# Patient Record
Sex: MALE | Race: WHITE | HISPANIC OR LATINO | URBAN - METROPOLITAN AREA
[De-identification: names, ages, dates, MRNs, and addresses within clinical notes are randomized per-mention and may not be internally consistent; named-entity substitution may affect disease eponyms.]

---

## 2022-05-12 ENCOUNTER — APPOINTMENT (RX ONLY)
Dept: URBAN - METROPOLITAN AREA CLINIC 329 | Facility: CLINIC | Age: 27
Setting detail: DERMATOLOGY
End: 2022-05-12

## 2022-05-12 DIAGNOSIS — L30.1 DYSHIDROSIS [POMPHOLYX]: ICD-10-CM | Status: INADEQUATELY CONTROLLED

## 2022-05-12 DIAGNOSIS — Q819 OTHER SPECIFIED ANOMALIES OF SKIN: ICD-10-CM | Status: INADEQUATELY CONTROLLED

## 2022-05-12 DIAGNOSIS — Q826 OTHER SPECIFIED ANOMALIES OF SKIN: ICD-10-CM | Status: INADEQUATELY CONTROLLED

## 2022-05-12 DIAGNOSIS — Z71.89 OTHER SPECIFIED COUNSELING: ICD-10-CM

## 2022-05-12 DIAGNOSIS — Q828 OTHER SPECIFIED ANOMALIES OF SKIN: ICD-10-CM | Status: INADEQUATELY CONTROLLED

## 2022-05-12 PROBLEM — L85.8 OTHER SPECIFIED EPIDERMAL THICKENING: Status: ACTIVE | Noted: 2022-05-12

## 2022-05-12 PROCEDURE — ? ADDITIONAL NOTES

## 2022-05-12 PROCEDURE — ? FULL BODY SKIN EXAM

## 2022-05-12 PROCEDURE — 99204 OFFICE O/P NEW MOD 45 MIN: CPT

## 2022-05-12 PROCEDURE — ? PRESCRIPTION MEDICATION MANAGEMENT

## 2022-05-12 PROCEDURE — ? COUNSELING

## 2022-05-12 PROCEDURE — ? PRESCRIPTION

## 2022-05-12 RX ORDER — TRIAMCINOLONE ACETONIDE 0.25 MG/G
CREAM TOPICAL
Qty: 80 | Refills: 3 | Status: ERX | COMMUNITY
Start: 2022-05-12

## 2022-05-12 RX ORDER — AMMONIUM LACTATE 12 G/100G
LOTION TOPICAL
Qty: 1 | Refills: 4 | Status: ERX | COMMUNITY
Start: 2022-05-12

## 2022-05-12 RX ORDER — FLUTICASONE PROPIONATE 0.05 MG/G
OINTMENT TOPICAL
Qty: 60 | Refills: 3 | Status: ERX | COMMUNITY
Start: 2022-05-12

## 2022-05-12 RX ADMIN — TRIAMCINOLONE ACETONIDE: 0.25 CREAM TOPICAL at 00:00

## 2022-05-12 RX ADMIN — FLUTICASONE PROPIONATE: 0.05 OINTMENT TOPICAL at 00:00

## 2022-05-12 RX ADMIN — AMMONIUM LACTATE: 12 LOTION TOPICAL at 00:00

## 2022-05-12 ASSESSMENT — LOCATION DETAILED DESCRIPTION DERM
LOCATION DETAILED: LEFT PROXIMAL POSTERIOR UPPER ARM
LOCATION DETAILED: LEFT ANTERIOR PROXIMAL UPPER ARM
LOCATION DETAILED: LEFT PROXIMAL POSTERIOR UPPER ARM
LOCATION DETAILED: RIGHT PROXIMAL POSTERIOR UPPER ARM
LOCATION DETAILED: LEFT INFERIOR LATERAL MIDBACK
LOCATION DETAILED: RIGHT PROXIMAL POSTERIOR UPPER ARM
LOCATION DETAILED: LEFT BUTTOCK
LOCATION DETAILED: RIGHT DISTAL POSTERIOR UPPER ARM
LOCATION DETAILED: LEFT BUTTOCK

## 2022-05-12 ASSESSMENT — LOCATION SIMPLE DESCRIPTION DERM
LOCATION SIMPLE: RIGHT UPPER ARM
LOCATION SIMPLE: LEFT UPPER ARM
LOCATION SIMPLE: LEFT BUTTOCK
LOCATION SIMPLE: RIGHT UPPER ARM
LOCATION SIMPLE: LEFT LOWER BACK
LOCATION SIMPLE: LEFT UPPER ARM
LOCATION SIMPLE: LEFT BUTTOCK

## 2022-05-12 ASSESSMENT — LOCATION ZONE DERM
LOCATION ZONE: TRUNK
LOCATION ZONE: ARM
LOCATION ZONE: TRUNK
LOCATION ZONE: ARM

## 2022-05-12 ASSESSMENT — BSA RASH: BSA RASH: 4

## 2022-05-12 ASSESSMENT — SEVERITY ASSESSMENT 2020: SEVERITY 2020: MILD

## 2022-05-12 ASSESSMENT — PAIN INTENSITY VAS: HOW INTENSE IS YOUR PAIN 0 BEING NO PAIN, 10 BEING THE MOST SEVERE PAIN POSSIBLE?: NO PAIN

## 2022-05-12 ASSESSMENT — ITCH NUMERIC RATING SCALE: HOW SEVERE IS YOUR ITCHING?: 1

## 2022-05-12 NOTE — PROCEDURE: ADDITIONAL NOTES
Detail Level: Simple
Render Risk Assessment In Note?: no
Additional Notes: Gentle cleanser don’t scrub the areas

## 2025-05-19 ENCOUNTER — OFFICE VISIT (OUTPATIENT)
Dept: ORTHOPEDIC SURGERY | Age: 30
End: 2025-05-19
Payer: COMMERCIAL

## 2025-05-19 DIAGNOSIS — G89.29 CHRONIC SCAPULAR PAIN: ICD-10-CM

## 2025-05-19 DIAGNOSIS — M89.8X1 CHRONIC SCAPULAR PAIN: ICD-10-CM

## 2025-05-19 DIAGNOSIS — G89.29 CHRONIC RIGHT SHOULDER PAIN: Primary | ICD-10-CM

## 2025-05-19 DIAGNOSIS — M25.511 CHRONIC RIGHT SHOULDER PAIN: Primary | ICD-10-CM

## 2025-05-19 PROCEDURE — 99204 OFFICE O/P NEW MOD 45 MIN: CPT | Performed by: STUDENT IN AN ORGANIZED HEALTH CARE EDUCATION/TRAINING PROGRAM

## 2025-05-19 NOTE — PROGRESS NOTES
Name: Mariano Brody  YOB: 1995  Gender: male  MRN: [id number]  Date of Encounter:  5/19/2025       CHIEF COMPLAINT:     Chief Complaint   Patient presents with    Shoulder Pain     Right        SUBJECTIVE/OBJECTIVE:      HPI:    Mariano Brody  is a 29 y.o. pleasant male who presents today for a new evaluation of his right shoulder pain.    Mariano Brody  has no past medical history on file.     History of Present Illness  The patient is a 29-year-old male presenting with right shoulder arthralgia.    He reports persistent right shoulder pain and scapulothoracic pain, as well as right chest pain. This is a complex history, in which he's seen numerous providers and has had MRI's of the shoulder, chest wall, and thoracic spine. He was previously seen by Dr. Roa at [facility] for the shoulder.  A right shoulder MRI was performed which showed a possible labral tear and myotendinous strain of the right pectoralis.  He did complete some physical therapy and activity modifications without much improvement.  The glenohumeral joint injection was performed 6/14/2023.     He has a history of dextroscoliosis in the thoracic spine at T4 and T5. He has received prolotherapy and PRP injections in the scapular and thoracic bursa regions.Additionally, he received lidocaine injections from his pain management physician, which did not provide relief. Deep tissue massage has also been ineffective. He reports persistent thoracic pain, exacerbated by overhead activities or retraction movements, and hesitancy to perform pull-down exercises due to anticipated pain. He began practicing [employer] in November 2024, which he believes may be contributing to his symptoms. He has also noted worsening symptoms during periods of inactivity. He underwent 6 months of physical therapy 2 years ago, which did not provide significant relief, and recently completed another course of physical therapy, including dry  F19.94

## 2025-05-27 NOTE — PROGRESS NOTES
Name: Mariano Brody  YOB: 1995  Gender: male  MRN: [id number]  Date of Encounter:  5/28/2025       CHIEF COMPLAINT:     Chief Complaint   Patient presents with    Follow-up     Dx US R Shoulder        SUBJECTIVE/OBJECTIVE:      HPI:    Patient is a 29 y.o. year old male who presents today for a diagnostic ultrasound of the right shoulder.    Recall hx:     He has chronic shoulder, scapulothoracic pain.     Summary: . Chronic right shoulder and scapulothoracic pain with radiation into chest. He has seen numerous providers and has had MRI's of the shoulder, chest wall, and thoracic spine. He was previously seen by Dr. Roa at [facility] for the shoulder.  A right shoulder MRI was performed which showed a possible labral tear and myotendinous strain of the right pectoralis.  He did complete some physical therapy and activity modifications without much improvement.  The glenohumeral joint injection was performed 6/14/2023 but does not recall if it brought relief..     He has a history of dextroscoliosis in the thoracic spine at T4 and T5. He has received prolotherapy and PRP injections in the scapular and thoracic bursa regions.Additionally, he received lidocaine injections from his pain management physician, which did not provide relief. Deep tissue massage has also been ineffective. He reports persistent thoracic pain, exacerbated by overhead activities or retraction movements, and hesitancy to perform pull-down exercises due to anticipated pain. He began practicing [employer] in November 2024, which he believes may be contributing to his symptoms. He has also noted worsening symptoms during periods of inactivity. He underwent 6 months of physical therapy 2 years ago, which did not provide significant relief, and recently completed another course of physical therapy, including dry needling, without improvement.    The patient was diagnosed with ankylosing spondylitis in

## 2025-05-28 ENCOUNTER — OFFICE VISIT (OUTPATIENT)
Dept: ORTHOPEDIC SURGERY | Age: 30
End: 2025-05-28

## 2025-05-28 DIAGNOSIS — M25.511 CHRONIC RIGHT SHOULDER PAIN: Primary | ICD-10-CM

## 2025-05-28 DIAGNOSIS — G89.29 CHRONIC RIGHT SHOULDER PAIN: Primary | ICD-10-CM

## 2025-05-28 DIAGNOSIS — M89.8X1 CHRONIC SCAPULAR PAIN: ICD-10-CM

## 2025-05-28 DIAGNOSIS — G89.29 CHRONIC SCAPULAR PAIN: ICD-10-CM

## 2025-07-10 NOTE — PROGRESS NOTES
Name: Mariano Brody  YOB: 1995  Gender: male  MRN: [id number]  Date of Encounter:  7/15/2025       CHIEF COMPLAINT:     Chief Complaint   Patient presents with    Follow-up     Right Shoulder        SUBJECTIVE/OBJECTIVE:      HPI:    Patient is a 30 y.o. pleasant male who presents today for a return evaluation of his right shoulder.    Working diagnosis:   1. Chronic right shoulder pain    2. Chronic scapular pain       LOV: 5/28/2025     Recall Hx:   Chronic right shoulder and scapulothoracic pain with radiation into chest. He has seen numerous providers and has had MRI's of the shoulder, chest wall, and thoracic spine. He was previously seen by Dr. Roa at [facility] for the shoulder.  A right shoulder MRI was performed which showed a possible labral tear and myotendinous strain of the right pectoralis.  He did complete some physical therapy and activity modifications without much improvement.  The glenohumeral joint injection was performed 6/14/2023 but does not recall if it brought relief.     5/28/25: Dx US was normal. A GHI injection was performed.     7/15/25: He had no relief with the GHI. He cannot really recall if the day of the shot gave him any relief. He continues to have pain in the back, scapula, and shoulder.      PAST HISTORY:   Past medical, surgical, family, social history and allergies reviewed by me. Unchanged from prior visit.     REVIEW OF SYSTEMS:   As noted in HPI.     PHYSICAL EXAMINATION:     Gen: Well-developed, no acute distress   HEENT: NC/AT, EOMI   Neck: Trachea midline, normal ROM   CV: Regular rhythm by palpation of distal pulse, normal capillary refill   Pulm: No respiratory distress, no stridor   Psychiatric: Well oriented, normal mood and affect.   Skin: No rashes, lesions or ulcers, normal temperature, turgor, and texture on uninvolved extremity.      ORTHO EXAM:    Right Shoulder:     Inspection: no biceps deformity; no notable atrophy or

## 2025-07-15 ENCOUNTER — OFFICE VISIT (OUTPATIENT)
Dept: ORTHOPEDIC SURGERY | Age: 30
End: 2025-07-15
Payer: COMMERCIAL

## 2025-07-15 DIAGNOSIS — M25.511 CHRONIC RIGHT SHOULDER PAIN: Primary | ICD-10-CM

## 2025-07-15 DIAGNOSIS — G89.29 CHRONIC RIGHT SHOULDER PAIN: Primary | ICD-10-CM

## 2025-07-15 DIAGNOSIS — G89.29 CHRONIC SCAPULAR PAIN: ICD-10-CM

## 2025-07-15 DIAGNOSIS — M89.8X1 CHRONIC SCAPULAR PAIN: ICD-10-CM

## 2025-07-15 PROCEDURE — 99213 OFFICE O/P EST LOW 20 MIN: CPT | Performed by: STUDENT IN AN ORGANIZED HEALTH CARE EDUCATION/TRAINING PROGRAM

## 2025-08-05 ENCOUNTER — OFFICE VISIT (OUTPATIENT)
Dept: ORTHOPEDIC SURGERY | Age: 30
End: 2025-08-05
Payer: COMMERCIAL

## 2025-08-05 DIAGNOSIS — M25.562 CHRONIC PAIN OF LEFT KNEE: Primary | ICD-10-CM

## 2025-08-05 DIAGNOSIS — G89.29 CHRONIC PAIN OF LEFT KNEE: Primary | ICD-10-CM

## 2025-08-05 DIAGNOSIS — M25.562 LEFT ANTERIOR KNEE PAIN: ICD-10-CM

## 2025-08-05 PROCEDURE — 99214 OFFICE O/P EST MOD 30 MIN: CPT | Performed by: STUDENT IN AN ORGANIZED HEALTH CARE EDUCATION/TRAINING PROGRAM
